# Patient Record
Sex: FEMALE | Race: BLACK OR AFRICAN AMERICAN | NOT HISPANIC OR LATINO | URBAN - METROPOLITAN AREA
[De-identification: names, ages, dates, MRNs, and addresses within clinical notes are randomized per-mention and may not be internally consistent; named-entity substitution may affect disease eponyms.]

---

## 2022-12-12 ENCOUNTER — OFFICE (OUTPATIENT)
Dept: URBAN - METROPOLITAN AREA CLINIC 67 | Facility: CLINIC | Age: 47
End: 2022-12-12

## 2022-12-12 VITALS
DIASTOLIC BLOOD PRESSURE: 90 MMHG | OXYGEN SATURATION: 99 % | HEART RATE: 82 BPM | DIASTOLIC BLOOD PRESSURE: 88 MMHG | HEIGHT: 63 IN | WEIGHT: 242 LBS | SYSTOLIC BLOOD PRESSURE: 140 MMHG

## 2022-12-12 DIAGNOSIS — K21.9 GASTRO-ESOPHAGEAL REFLUX DISEASE WITHOUT ESOPHAGITIS: ICD-10-CM

## 2022-12-12 DIAGNOSIS — K43.9 VENTRAL HERNIA WITHOUT OBSTRUCTION OR GANGRENE: ICD-10-CM

## 2022-12-12 PROCEDURE — 99204 OFFICE O/P NEW MOD 45 MIN: CPT | Performed by: NURSE PRACTITIONER

## 2022-12-12 NOTE — SERVICEHPINOTES
Grace Mehta   is here today as a   referral from PCP for reflux that started after her gastric bypass surgery and also has complaints of a hernia. She reports that she only has reflex at times and take tums as needed. She only takes tums about once a month or less. She is not sure why she was recommended for reflux. She reports that it is not a problem at this time. br
br She is concerned about a possible hernia. She reports a knot on her left lower quadrant that she first noticed in February and she remembers being told she had a hernia in 2018 after a CT. She reports the pain is like a pulled muscle if she has done too much throughout the day. The pain is never unbearable. The pressure is daily. The knot has not grown in size, but she does report swelling in that area. Pelvis US in 10/22 showed Ventral hernia as well. Imaging br10/5/2022 Pelvic US- ventral abdominal wall hernia containing fat in left suprapubic area Labs br10/21/22 Lipid- Chol 220, . CBC, CMP, HgbA1C, TSH- normal